# Patient Record
Sex: MALE | Race: WHITE | NOT HISPANIC OR LATINO
[De-identification: names, ages, dates, MRNs, and addresses within clinical notes are randomized per-mention and may not be internally consistent; named-entity substitution may affect disease eponyms.]

---

## 2023-01-01 ENCOUNTER — NON-APPOINTMENT (OUTPATIENT)
Age: 88
End: 2023-01-01

## 2023-01-01 ENCOUNTER — APPOINTMENT (OUTPATIENT)
Dept: HOME HEALTH SERVICES | Facility: HOME HEALTH | Age: 88
End: 2023-01-01
Payer: MEDICARE

## 2023-01-01 ENCOUNTER — RESULT REVIEW (OUTPATIENT)
Age: 88
End: 2023-01-01

## 2023-01-01 ENCOUNTER — APPOINTMENT (OUTPATIENT)
Dept: BREAST CENTER | Facility: CLINIC | Age: 88
End: 2023-01-01
Payer: MEDICARE

## 2023-01-01 ENCOUNTER — APPOINTMENT (OUTPATIENT)
Dept: HOME HEALTH SERVICES | Facility: HOME HEALTH | Age: 88
End: 2023-01-01

## 2023-01-01 ENCOUNTER — LABORATORY RESULT (OUTPATIENT)
Age: 88
End: 2023-01-01

## 2023-01-01 ENCOUNTER — TRANSCRIPTION ENCOUNTER (OUTPATIENT)
Age: 88
End: 2023-01-01

## 2023-01-01 ENCOUNTER — APPOINTMENT (OUTPATIENT)
Dept: RADIATION ONCOLOGY | Facility: CLINIC | Age: 88
End: 2023-01-01
Payer: MEDICARE

## 2023-01-01 ENCOUNTER — APPOINTMENT (OUTPATIENT)
Dept: THORACIC SURGERY | Facility: CLINIC | Age: 88
End: 2023-01-01

## 2023-01-01 ENCOUNTER — FORM ENCOUNTER (OUTPATIENT)
Age: 88
End: 2023-01-01

## 2023-01-01 ENCOUNTER — APPOINTMENT (OUTPATIENT)
Dept: RADIATION ONCOLOGY | Facility: CLINIC | Age: 88
End: 2023-01-01

## 2023-01-01 ENCOUNTER — APPOINTMENT (OUTPATIENT)
Dept: BREAST CENTER | Facility: HOSPITAL | Age: 88
End: 2023-01-01

## 2023-01-01 VITALS — SYSTOLIC BLOOD PRESSURE: 130 MMHG | DIASTOLIC BLOOD PRESSURE: 80 MMHG | OXYGEN SATURATION: 97 % | HEART RATE: 70 BPM

## 2023-01-01 VITALS
HEART RATE: 82 BPM | BODY MASS INDEX: 30.07 KG/M2 | TEMPERATURE: 97.5 F | DIASTOLIC BLOOD PRESSURE: 70 MMHG | WEIGHT: 203 LBS | HEIGHT: 69 IN | OXYGEN SATURATION: 96 % | SYSTOLIC BLOOD PRESSURE: 115 MMHG

## 2023-01-01 VITALS
HEART RATE: 87 BPM | WEIGHT: 203 LBS | OXYGEN SATURATION: 96 % | BODY MASS INDEX: 30.07 KG/M2 | HEIGHT: 69 IN | RESPIRATION RATE: 18 BRPM | SYSTOLIC BLOOD PRESSURE: 137 MMHG | DIASTOLIC BLOOD PRESSURE: 85 MMHG

## 2023-01-01 VITALS
RESPIRATION RATE: 18 BRPM | OXYGEN SATURATION: 97 % | HEART RATE: 95 BPM | SYSTOLIC BLOOD PRESSURE: 132 MMHG | DIASTOLIC BLOOD PRESSURE: 86 MMHG

## 2023-01-01 VITALS
SYSTOLIC BLOOD PRESSURE: 126 MMHG | OXYGEN SATURATION: 95 % | HEART RATE: 87 BPM | BODY MASS INDEX: 29.98 KG/M2 | WEIGHT: 203 LBS | DIASTOLIC BLOOD PRESSURE: 81 MMHG

## 2023-01-01 VITALS
BODY MASS INDEX: 30.07 KG/M2 | HEIGHT: 69 IN | OXYGEN SATURATION: 99 % | DIASTOLIC BLOOD PRESSURE: 78 MMHG | SYSTOLIC BLOOD PRESSURE: 130 MMHG | RESPIRATION RATE: 16 BRPM | HEART RATE: 85 BPM | WEIGHT: 203 LBS

## 2023-01-01 VITALS — HEART RATE: 80 BPM | OXYGEN SATURATION: 98 % | SYSTOLIC BLOOD PRESSURE: 130 MMHG | DIASTOLIC BLOOD PRESSURE: 80 MMHG

## 2023-01-01 VITALS
RESPIRATION RATE: 16 BRPM | SYSTOLIC BLOOD PRESSURE: 125 MMHG | OXYGEN SATURATION: 98 % | BODY MASS INDEX: 29.98 KG/M2 | WEIGHT: 203 LBS | DIASTOLIC BLOOD PRESSURE: 74 MMHG | HEART RATE: 96 BPM

## 2023-01-01 VITALS
SYSTOLIC BLOOD PRESSURE: 118 MMHG | HEART RATE: 77 BPM | TEMPERATURE: 98.1 F | DIASTOLIC BLOOD PRESSURE: 62 MMHG | OXYGEN SATURATION: 97 % | RESPIRATION RATE: 18 BRPM

## 2023-01-01 VITALS
RESPIRATION RATE: 18 BRPM | HEART RATE: 78 BPM | DIASTOLIC BLOOD PRESSURE: 68 MMHG | OXYGEN SATURATION: 97 % | TEMPERATURE: 98.4 F | SYSTOLIC BLOOD PRESSURE: 136 MMHG

## 2023-01-01 VITALS — OXYGEN SATURATION: 98 % | HEART RATE: 85 BPM | DIASTOLIC BLOOD PRESSURE: 80 MMHG | SYSTOLIC BLOOD PRESSURE: 130 MMHG

## 2023-01-01 VITALS
HEART RATE: 94 BPM | WEIGHT: 203 LBS | DIASTOLIC BLOOD PRESSURE: 74 MMHG | OXYGEN SATURATION: 97 % | RESPIRATION RATE: 16 BRPM | BODY MASS INDEX: 29.98 KG/M2 | SYSTOLIC BLOOD PRESSURE: 115 MMHG

## 2023-01-01 VITALS
OXYGEN SATURATION: 96 % | RESPIRATION RATE: 16 BRPM | SYSTOLIC BLOOD PRESSURE: 132 MMHG | HEART RATE: 80 BPM | DIASTOLIC BLOOD PRESSURE: 84 MMHG

## 2023-01-01 VITALS
TEMPERATURE: 97.8 F | SYSTOLIC BLOOD PRESSURE: 120 MMHG | RESPIRATION RATE: 18 BRPM | OXYGEN SATURATION: 96 % | HEART RATE: 82 BPM | DIASTOLIC BLOOD PRESSURE: 70 MMHG

## 2023-01-01 DIAGNOSIS — B37.0 CANDIDAL STOMATITIS: ICD-10-CM

## 2023-01-01 DIAGNOSIS — Z86.14 PERSONAL HISTORY OF METHICILLIN RESISTANT STAPHYLOCOCCUS AUREUS INFECTION: ICD-10-CM

## 2023-01-01 DIAGNOSIS — M1A.9XX0 CHRONIC GOUT, UNSPECIFIED, W/OUT TOPHUS (TOPHI): ICD-10-CM

## 2023-01-01 DIAGNOSIS — E03.9 HYPOTHYROIDISM, UNSPECIFIED: ICD-10-CM

## 2023-01-01 DIAGNOSIS — B36.9 SUPERFICIAL MYCOSIS, UNSPECIFIED: ICD-10-CM

## 2023-01-01 DIAGNOSIS — R06.09 OTHER FORMS OF DYSPNEA: ICD-10-CM

## 2023-01-01 DIAGNOSIS — S01.90XA UNSPECIFIED OPEN WOUND OF UNSPECIFIED PART OF HEAD, INITIAL ENCOUNTER: ICD-10-CM

## 2023-01-01 DIAGNOSIS — I72.4 ANEURYSM OF ARTERY OF LOWER EXTREMITY: ICD-10-CM

## 2023-01-01 DIAGNOSIS — R23.8 OTHER SKIN CHANGES: ICD-10-CM

## 2023-01-01 DIAGNOSIS — H10.9 UNSPECIFIED CONJUNCTIVITIS: ICD-10-CM

## 2023-01-01 DIAGNOSIS — S01.80XA UNSPECIFIED OPEN WOUND OF OTHER PART OF HEAD, INITIAL ENCOUNTER: ICD-10-CM

## 2023-01-01 PROCEDURE — 99024 POSTOP FOLLOW-UP VISIT: CPT

## 2023-01-01 PROCEDURE — 99203 OFFICE O/P NEW LOW 30 MIN: CPT

## 2023-01-01 PROCEDURE — 99344 HOME/RES VST NEW MOD MDM 60: CPT

## 2023-01-01 PROCEDURE — 99215 OFFICE O/P EST HI 40 MIN: CPT

## 2023-01-01 PROCEDURE — 99349 HOME/RES VST EST MOD MDM 40: CPT

## 2023-01-01 PROCEDURE — 99348 HOME/RES VST EST LOW MDM 30: CPT

## 2023-01-01 PROCEDURE — 99496 TRANSJ CARE MGMT HIGH F2F 7D: CPT

## 2023-01-01 RX ORDER — COLLAGENASE SANTYL 250 [ARB'U]/G
250 OINTMENT TOPICAL DAILY
Qty: 1 | Refills: 2 | Status: ACTIVE | COMMUNITY
Start: 2023-01-01 | End: 1900-01-01

## 2023-01-01 RX ORDER — GENTAMICIN SULFATE 3 MG/ML
0.3 SOLUTION OPHTHALMIC 4 TIMES DAILY
Qty: 1 | Refills: 3 | Status: ACTIVE | COMMUNITY
Start: 2023-01-01 | End: 1900-01-01

## 2023-01-01 RX ORDER — NYSTATIN 100000 1/G
100000 POWDER TOPICAL 3 TIMES DAILY
Qty: 1 | Refills: 2 | Status: ACTIVE | COMMUNITY
Start: 2023-01-01

## 2023-01-01 RX ORDER — BUSPIRONE HYDROCHLORIDE 5 MG/1
5 TABLET ORAL DAILY
Qty: 90 | Refills: 0 | Status: ACTIVE | COMMUNITY
Start: 2023-01-01

## 2023-01-01 RX ORDER — AMLODIPINE BESYLATE 5 MG/1
5 TABLET ORAL DAILY
Qty: 90 | Refills: 2 | Status: ACTIVE | COMMUNITY
Start: 2023-01-01 | End: 1900-01-01

## 2023-01-01 RX ORDER — SALIVA STIMULANT COMB. NO.3
SPRAY, NON-AEROSOL (ML) MUCOUS MEMBRANE
Qty: 1 | Refills: 0 | Status: ACTIVE | COMMUNITY
Start: 2023-01-01 | End: 1900-01-01

## 2023-01-01 RX ORDER — ALBUTEROL SULFATE 2.5 MG/3ML
(2.5 MG/3ML) SOLUTION RESPIRATORY (INHALATION) EVERY 6 HOURS
Qty: 360 | Refills: 2 | Status: ACTIVE | COMMUNITY
Start: 2023-01-01 | End: 1900-01-01

## 2023-01-01 RX ORDER — FUROSEMIDE 40 MG/1
40 TABLET ORAL
Qty: 30 | Refills: 0 | Status: DISCONTINUED | COMMUNITY
Start: 2023-01-01 | End: 2023-01-01

## 2023-01-01 RX ORDER — ASPIRIN ENTERIC COATED TABLETS 81 MG 81 MG/1
81 TABLET, DELAYED RELEASE ORAL
Qty: 90 | Refills: 2 | Status: ACTIVE | COMMUNITY
Start: 2023-01-01

## 2023-01-01 RX ORDER — MUPIROCIN 20 MG/G
2 OINTMENT TOPICAL TWICE DAILY
Qty: 1 | Refills: 0 | Status: DISCONTINUED | COMMUNITY
Start: 2023-01-01 | End: 2023-01-01

## 2023-01-01 RX ORDER — MUPIROCIN 20 MG/G
2 OINTMENT TOPICAL
Qty: 1 | Refills: 5 | Status: ACTIVE | COMMUNITY
Start: 2023-01-01 | End: 1900-01-01

## 2023-01-01 RX ORDER — NYSTATIN 100000 [USP'U]/ML
100000 SUSPENSION ORAL
Qty: 1 | Refills: 0 | Status: ACTIVE | COMMUNITY
Start: 2023-01-01 | End: 1900-01-01

## 2023-01-01 RX ORDER — SILVER SULFADIAZINE 10 MG/G
1 CREAM TOPICAL DAILY
Qty: 1 | Refills: 2 | Status: ACTIVE | COMMUNITY
Start: 2023-01-01 | End: 1900-01-01

## 2023-01-01 RX ORDER — IPRATROPIUM BROMIDE AND ALBUTEROL SULFATE 2.5; .5 MG/3ML; MG/3ML
0.5-2.5 (3) SOLUTION RESPIRATORY (INHALATION)
Qty: 2 | Refills: 3 | Status: ACTIVE | COMMUNITY
Start: 2023-01-01 | End: 1900-01-01

## 2023-01-01 RX ORDER — BUDESONIDE 0.5 MG/2ML
0.5 INHALANT ORAL TWICE DAILY
Qty: 90 | Refills: 3 | Status: ACTIVE | COMMUNITY
Start: 2023-01-01 | End: 1900-01-01

## 2023-02-23 PROBLEM — I72.4 POPLITEAL ANEURYSM: Status: RESOLVED | Noted: 2023-01-01 | Resolved: 2023-01-01

## 2023-02-23 PROBLEM — M1A.9XX0 CHRONIC GOUT: Status: ACTIVE | Noted: 2023-01-01

## 2023-02-23 NOTE — PHYSICAL EXAM
[No Acute Distress] : no acute distress [Well Nourished] : well nourished [Normal Sclera/Conjunctiva] : normal sclera/conjunctiva [Normal Outer Ear/Nose] : the ears and nose were normal in appearance [No JVD] : no jugular venous distention [Supple] : the neck was supple [No Respiratory Distress] : no respiratory distress [Clear to Auscultation] : lungs were clear to auscultation bilaterally [Normal Rate] : heart rate was normal  [Normal Bowel Sounds] : normal bowel sounds [Non Tender] : non-tender [Soft] : abdomen soft [de-identified] : fungal rash to groin

## 2023-02-23 NOTE — REVIEW OF SYSTEMS
[Incontinence] : incontinence [Muscle Weakness] : muscle weakness [Negative] : Gastrointestinal [de-identified] : rash to groin

## 2023-02-23 NOTE — HISTORY OF PRESENT ILLNESS
[Patient] : patient [Formal Caregiver] : formal caregiver [FreeTextEntry1] : Wheelchair bound [FreeTextEntry2] : Patient being seen for initial House Calls visit, patient lives with wife, who has dementia, and has 24/7 HHA.  Patient states that he has had 2 stents in his legs. As per DTR patient diagnosed in AAA many years ago, recently it was noted to have increased in size but no intervention was done.  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  He was on coumadin until recently when he had 2 large bleeds and decided ASA 81mg was safer. He gets Q6 month dopplers of lower extremities.  \par Daughter lives near by and comes to fill pill boxes.

## 2023-03-30 PROBLEM — E03.9 HYPOTHYROIDISM, UNSPECIFIED TYPE: Status: ACTIVE | Noted: 2023-01-01

## 2023-03-30 NOTE — HISTORY OF PRESENT ILLNESS
[Patient] : patient [Formal Caregiver] : formal caregiver [FreeTextEntry1] : Wheelchair bound [FreeTextEntry2] : Patient being seen for follow up House Calls visit, patient lives with wife, who has dementia, and has 24/7 HHA.  Patient states that he has had 2 stents in his legs. As per DTR patient diagnosed in AAA many years ago, recently it was noted to have increased in size but no intervention was done.  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  He was on coumadin until recently when he had 2 large bleeds and decided ASA 81mg was safer. He gets Q6 month dopplers of lower extremities.  \par Daughter lives near by and comes to fill pill boxes. \par \par Patient concerned about size of AAA, no study available to compare. \par Patient c/o wax in his ears.

## 2023-03-30 NOTE — PHYSICAL EXAM
[No Acute Distress] : no acute distress [Well Nourished] : well nourished [Normal Sclera/Conjunctiva] : normal sclera/conjunctiva [Normal Outer Ear/Nose] : the ears and nose were normal in appearance [Normal TMs] : both tympanic membranes were normal [No JVD] : no jugular venous distention [Supple] : the neck was supple [No Respiratory Distress] : no respiratory distress [Clear to Auscultation] : lungs were clear to auscultation bilaterally [Normal Rate] : heart rate was normal  [Normal Bowel Sounds] : normal bowel sounds [Non Tender] : non-tender [Soft] : abdomen soft [de-identified] : Rt side of head with 1 ich wound clean moist wound bed.

## 2023-03-30 NOTE — REVIEW OF SYSTEMS
[Incontinence] : incontinence [Muscle Weakness] : muscle weakness [Negative] : Gastrointestinal [de-identified] : rash to groin

## 2023-04-21 NOTE — HISTORY OF PRESENT ILLNESS
[Patient] : patient [FreeTextEntry1] : Wheelchair bound [FreeTextEntry2] : Patient being seen for concerns about wound to Rt temple area.  Patient states that the wound is getting bigger.  He has been applying mupirocin cream daily and covering with DSD. Denies any odor, drainage, or pain.

## 2023-04-21 NOTE — PHYSICAL EXAM
[No Acute Distress] : no acute distress [No Respiratory Distress] : no respiratory distress [Clear to Auscultation] : lungs were clear to auscultation bilaterally [Normal Rate] : heart rate was normal  [de-identified] : Rt temporal area with 1inch x 0.75 inch opening, clean moist pink wound bed.  No drainage or odor.

## 2023-07-12 PROBLEM — S01.90XA: Status: ACTIVE | Noted: 2023-01-01

## 2023-07-12 NOTE — PHYSICAL EXAM
[No Supraclavicular Adenopathy] : no supraclavicular adenopathy [No Cervical Adenopathy] : no cervical adenopathy [de-identified] : In the right temple/preauricular area is a 5 x 6 cm ulcerative lesion with firm granulation tissue at its base that is bleeding in multiple areas.  I applied silver nitrate to these areas as well as pressure which stopped the bleeding.

## 2023-07-12 NOTE — HISTORY OF PRESENT ILLNESS
[FreeTextEntry1] : 87-year-old gentleman with right temple facial skin tumor treated 5 years ago with Mohs surgery at [facility].  I do not have any of his reports.  He says approximately 1 month ago the right temple wound opened and is nonhealing with extreme sensitivity and occasional bleeding.  Patient is on aspirin but not on any other anticoagulation.  Patient was referred to me for biopsy and treatment by his dermatologist.  Patient is extremely sensitive and has a is already feeling faint while being in the office.

## 2023-07-31 PROBLEM — S01.80XA: Status: ACTIVE | Noted: 2023-01-01

## 2023-07-31 NOTE — PHYSICAL EXAM
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  The wound is clean and granulating.

## 2023-07-31 NOTE — ASSESSMENT
[FreeTextEntry1] : 87-year-old gentleman with right temple facial skin tumor treated 5 years ago with Mohs surgery at [facility].  I do not have any of his reports.  He says the right temple wound opened around June 2023 and is nonhealing with extreme sensitivity and occasional bleeding.  The patient is on aspirin but not on any other anticoagulation.  He was referred to me for biopsy and treatment by his dermatologist and the area is extremely sensitive and he was feeling faint while being in the office.  On exam in the office there was a 5 x 6 cm ulcerative lesion with granulation tissue at the base in the right temporal/preauricular region which was exquisitely tender and had some bleeding and Dr. Madison initially used some silver nitrate.  The patient could not tolerate a biopsy under local anesthesia in the office and was taken to the operating room on July 25, 2023 and underwent right temporal skin biopsies which showed multiple biopsies with poorly differentiated invasive squamous cell carcinoma.  On exam, this right temporal facial squamous cell carcinomas ulcerating but not actively bleeding at this time.  The wound is clean and the dressing was replaced with Silvadene dressing change. I went over the pathology with the patient and gave him a copy the report for his records.  The wound remains ulcerating and I would like him to continue local dressing changes with Silvadene. I would like him to follow-up with Dr. Madison in 2 weeks week for another wound evaluation.  He has an appointment to see Dr. Howe for a radiation oncology evaluation to determine a treatment plan for this patient.

## 2023-07-31 NOTE — HISTORY OF PRESENT ILLNESS
[FreeTextEntry1] : 87-year-old gentleman with right temple facial skin tumor treated 5 years ago with Mohs surgery at [facility].  I do not have any of his reports.  He says the right temple wound opened around June 2023 and is nonhealing with extreme sensitivity and occasional bleeding.  The patient is on aspirin but not on any other anticoagulation.  He was referred to me for biopsy and treatment by his dermatologist and the area is extremely sensitive and he was feeling faint while being in the office.  On exam in the office there was a 5 x 6 cm ulcerative lesion with granulation tissue at the base in the right temporal/preauricular region which was exquisitely tender and had some bleeding and Dr. Madison initially used some silver nitrate.  The patient could not tolerate a biopsy under local anesthesia in the office and was taken to the operating room on July 25, 2023 and underwent right temporal skin biopsies which showed multiple biopsies with poorly differentiated invasive squamous cell carcinoma.  He comes in now for a postoperative follow-up.

## 2023-08-14 NOTE — HISTORY OF PRESENT ILLNESS
[FreeTextEntry1] : MORRIS MARTINEZ is an 87 year old man with SCC of the right face referred for consideration of radiation therapy.  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 He states that he may have had one treated with radiation he cannot remember. He also states he had a lymph node removed from his left neck, he is not sure for what reason.  The patient has a history of a right temporal facial skin tumor treated 5 years ago with Mohs surgery at [facility] which has now recurred in the right temporal preauricular region.  He was taken to the operating room for skin biopsies under IV sedation by Dr. Madison on July 25, 2023.   The biopsies all showed poorly differentiated invasive squamous cell carcinoma.  8/8/2023 Today he is here to discuss radiation therapy options for his skin cancer diagnosis. He report's that his aide is changing his dressing daily and that it bleeds and has some purulent discharge.  He is using Silvadene cream to the area.  It is very tender. He also notes right facial droop since the lesion appeared.

## 2023-08-14 NOTE — REVIEW OF SYSTEMS
[Difficulty Walking] : difficulty walking [Negative] : Allergic/Immunologic [FreeTextEntry4] : facial lesion and right facial droop as per HPI [de-identified] : many scattered age related sun lesions

## 2023-08-14 NOTE — PHYSICAL EXAM
[General Appearance - Well Developed] : well developed [Sclera] : the sclera and conjunctiva were normal [Extraocular Movements] : extraocular movements were intact [] : no respiratory distress [Cervical Lymph Nodes Enlarged Posterior Bilaterally] : posterior cervical [Cervical Lymph Nodes Enlarged Anterior Bilaterally] : anterior cervical [Supraclavicular Lymph Nodes Enlarged Bilaterally] : supraclavicular [Oriented To Time, Place, And Person] : oriented to person, place, and time [de-identified] : Large ulcerated 6 x 6 cm lesion in the right james-auricular area; the lesion is very tender to palpation; areas of bleeding noted; s/p recent biopsy with stitch in place; very poor dentition - with few remaining teeth [de-identified] : No palpable neck nodes [de-identified] : Diffuse scattered age related skin lesions [de-identified] : Weakness of the right facial muscles with right facial droop noted and mild right ptosis; patient is unable to keep right eye closed against reisstance (impaired right facial nerve)

## 2023-08-14 NOTE — ADDENDUM
[FreeTextEntry1] : Since the patient was seen in consult, the following records were received regarding his history of malignancies: Per report (no record of the treatment), the patient had a skin cancer of the left temple treated by a single fraction of radiation in the 1970s. 1. 07/19/2001: shave biopsy of left temple cicatrix w/o residual carcinoma; skin left forehead and skin right cheek both positive for SCC in-situ 2. 08/06/2001: left temple shave excision positive for SCC in-situ, at least, present at deep margin 3. 05/21/2002: left temple re-excision positive for microscopic focus of SCC in-situ 4. 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 12/30/2012: skin left temple biopsy - ulcerated atypical intradermal spindle cell neoplasm  6. 02/27/2013: left extensor forearm - invasive SCC well to moderately differentiated 7. 03/21/2013: left extensor forearm - invasive SCC well to moderately differentiated 8. 03/18/2013: skin excision left side of face - cicatrix, no residual atypical spindle cell neoplasm 9. 04/30/2013: left distal arm shave biopsy SCC well to moderately differentiated 10. 05/21/2013: left upper arm excision, SCC well to moderately differentiated, +PNI, invading full thickness of dermis, Pt2N0 Patient received adjuvant radiation to the left elbow using electrons to 48Gy in 20 Fx from 06/26/2013-07/24/2013 11. 07/22/2014: left axillary mass FNA - squamous cell carcinoma 12. 09/05/2014: left axillary mass excision - squamous cell carcinoma in subcutis. all LNs negative. The patient received a course of adjuvant radiation to the left axilla to 54Gy in 27 Fx from 02/17/2015-04/01/2015 13. 03/18/2015: right nasal sidewalll SCC at least in-situ 14. 08/24/2016: ride side of neck -  SCC in-situ 15. 06/21/2018: skin right forehead - SCC in situ; skin left cheek - SCC invasive well to moderately differentiated; skin right side of neck - SCC in situ; skin left mandibular cheek - SCC at least in situ; skin dorsum of left hand - SCC superficially invasive  16. 04/04/2019: left CW FNA was non-Diagnostic  In summary, what is relevant to the current case is that: 1. The patient had a very distant h/o RT to the left temple in the 1970s for a skin cancer. This cancer then recurred in 2001 as a SCC, in situ at least and was removed surgically. It then recurred in 2012 as a spindle cell neoplasm. This was apparently removed entirely in a biopsy bc repeat biopsy in 2013 revealed no residual spindle cells. There does not appear to have been any radiation treatment given to this area. He states all his RT has been given at [facility] and they have no record of RT to the left temple. 2. The patient has had 3 prior courses of RT - (1) to the larynx and bilateral neck in 2010; (2) to the left arm in 2023 for pT2 SCC; (3) to the left axilla in 2015 for a large SCC involving a left axillary LN  In light of the above, I believe it would be safe to deliver a course of definitive radiation to his NEW RIGHT temple squamous cell cancer. He has not received RT to the right temple nor is there a documnted hx of cancer of the right temple until presently

## 2023-08-16 NOTE — REASON FOR VISIT
[Post Op: _________] : a [unfilled] post op visit [FreeTextEntry1] : Status post biopsies of right temporal lesion

## 2023-08-16 NOTE — HISTORY OF PRESENT ILLNESS
[FreeTextEntry1] : 87-year-old gentleman with right temple facial skin tumor treated 5 years ago with Mohs surgery at [facility].  I do not have any of his reports.  He says approximately 1 month ago the right temple wound opened and is nonhealing with extreme sensitivity and occasional bleeding.  Patient is on aspirin but not on any other anticoagulation.  Patient was referred to me for biopsy and treatment by his dermatologist.  Patient is extremely sensitive and has a is already feeling faint while being in the office.  Patient had operative biopsies of this area which revealed poorly differentiated invasive squamous cell carcinoma.  Patient has seen radiation oncology is trying to figure out his prior radiation history and once they do that he will set up up for radiation of this area for local control.  Otherwise patient is doing well, pain under control.

## 2023-12-18 PROBLEM — R23.8 SLOUGHING OF WOUND: Status: ACTIVE | Noted: 2023-01-01

## 2023-12-19 PROBLEM — B36.9 FUNGAL DERMATITIS: Status: RESOLVED | Noted: 2023-01-01 | Resolved: 2023-01-01

## 2023-12-19 PROBLEM — R06.09 DOE (DYSPNEA ON EXERTION): Status: RESOLVED | Noted: 2023-01-01 | Resolved: 2023-01-01

## 2023-12-19 PROBLEM — H10.9 CONJUNCTIVITIS, RIGHT EYE: Status: ACTIVE | Noted: 2023-01-01 | Resolved: 2024-01-01

## 2023-12-21 PROBLEM — B37.0 THRUSH: Status: ACTIVE | Noted: 2023-01-01

## 2023-12-28 NOTE — PHYSICAL EXAM
[No Acute Distress] : no acute distress [Normal Voice/Communication] : normal voice communication [Normal Outer Ear/Nose] : the ears and nose were normal in appearance [No JVD] : no jugular venous distention [No Respiratory Distress] : no respiratory distress [Clear to Auscultation] : lungs were clear to auscultation bilaterally [Normal Rate] : heart rate was normal  [Regular Rhythm] : with a regular rhythm [Normal S1, S2] : normal S1 and S2 [Normal Bowel Sounds] : normal bowel sounds [Non Tender] : non-tender [Oriented x3] : oriented to person, place, and time [Normal Affect] : the affect was normal [Normal Mood] : the mood was normal [de-identified] : Rt lower eye lid drooping, covered with eye patch for protection [de-identified] : multiple bulges, abdomen soft  [de-identified] : bed bound [de-identified] : dressing in place to Rt temple and sacral ulcer

## 2023-12-28 NOTE — REASON FOR VISIT
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 CT scan showing improvement in the lung abscess, but new nodularities and increased bronchial secretions, with concern for aspiration. Patient evaluated by ID.  Patient was started on Rocephin for gram-negative coverage.  Patient continued to remain on 5 L oxygen despite antibiotic treatment.  He was noted to be unable to manage secretions by the nursing repeat SLP evaluation was done which showed patient is unable to swallow anything at this point safely.  Patient was made n.p.o. and started on IV fluids, Patient continued to be high risk for aspiration. He had repeated swallow evaluations and an MBS, which showed aspiration of thin liquids, penetration of thickened liquids. The patient was getting tired after a few sips. SLP continues to advise NPO. D/W Patient and daughter regarding the results and further plan. Patient and daughter clear about not wanting feeding tube , but want patient wants comfort feeds. The patient's daughter reports she does not want to withdraw care, but does not want anything aggressive if patient aspirates again. Daughter was clearly explained the patient was choking when nursing was trying to feed him. Daughter wants to take responsibility of feeding the patient in hospital. Patient remains DNR/DNI.  Patient's family has been feeding him pureed and moderately thick liquids.  The patient was discharged home on remainder of abx with vancomycin, lita lift, and home O2.

## 2023-12-28 NOTE — HISTORY OF PRESENT ILLNESS
[Patient] : patient [FreeTextEntry1] : Bed bound [FreeTextEntry2] : Patient being seen for post discharge.  States he feels like his swallowing is getting better "I am not coughing"  DTR in a nurse practitioner and she is coming over to do IV antibiotics.

## 2024-01-01 ENCOUNTER — APPOINTMENT (OUTPATIENT)
Dept: GERIATRICS | Facility: CLINIC | Age: 89
End: 2024-01-01
Payer: MEDICARE

## 2024-01-01 ENCOUNTER — APPOINTMENT (OUTPATIENT)
Dept: HOME HEALTH SERVICES | Facility: HOME HEALTH | Age: 89
End: 2024-01-01
Payer: MEDICARE

## 2024-01-01 ENCOUNTER — NON-APPOINTMENT (OUTPATIENT)
Age: 89
End: 2024-01-01

## 2024-01-01 ENCOUNTER — APPOINTMENT (OUTPATIENT)
Dept: GERIATRICS | Facility: CLINIC | Age: 89
End: 2024-01-01

## 2024-01-01 VITALS — HEART RATE: 78 BPM | OXYGEN SATURATION: 96 % | DIASTOLIC BLOOD PRESSURE: 80 MMHG | SYSTOLIC BLOOD PRESSURE: 120 MMHG

## 2024-01-01 DIAGNOSIS — K40.90 UNILATERAL INGUINAL HERNIA, W/OUT OBSTRUCTION OR GANGRENE, NOT SPECIFIED AS RECURRENT: ICD-10-CM

## 2024-01-01 DIAGNOSIS — T17.908A UNSPECIFIED FOREIGN BODY IN RESPIRATORY TRACT, PART UNSPECIFIED CAUSING OTHER INJURY, INITIAL ENCOUNTER: ICD-10-CM

## 2024-01-01 DIAGNOSIS — I10 ESSENTIAL (PRIMARY) HYPERTENSION: ICD-10-CM

## 2024-01-01 DIAGNOSIS — J44.9 CHRONIC OBSTRUCTIVE PULMONARY DISEASE, UNSPECIFIED: ICD-10-CM

## 2024-01-01 DIAGNOSIS — I73.9 PERIPHERAL VASCULAR DISEASE, UNSPECIFIED: ICD-10-CM

## 2024-01-01 DIAGNOSIS — J85.2 ABSCESS OF LUNG W/OUT PNEUMONIA: ICD-10-CM

## 2024-01-01 DIAGNOSIS — L89.313 PRESSURE ULCER OF RIGHT BUTTOCK, STAGE 3: ICD-10-CM

## 2024-01-01 DIAGNOSIS — I71.40 ABDOMINAL AORTIC ANEURYSM, WITHOUT RUPTURE, UNSPECIFIED: ICD-10-CM

## 2024-01-01 DIAGNOSIS — Z51.5 ENCOUNTER FOR PALLIATIVE CARE: ICD-10-CM

## 2024-01-01 DIAGNOSIS — C44.320 SQUAMOUS CELL CARCINOMA OF SKIN OF UNSPECIFIED PARTS OF FACE: ICD-10-CM

## 2024-01-01 DIAGNOSIS — L98.9 DISORDER OF THE SKIN AND SUBCUTANEOUS TISSUE, UNSPECIFIED: ICD-10-CM

## 2024-01-01 DIAGNOSIS — Z71.89 OTHER SPECIFIED COUNSELING: ICD-10-CM

## 2024-01-01 DIAGNOSIS — Z74.01 BED CONFINEMENT STATUS: ICD-10-CM

## 2024-01-01 PROCEDURE — 99496 TRANSJ CARE MGMT HIGH F2F 7D: CPT

## 2024-01-01 PROCEDURE — 99205 OFFICE O/P NEW HI 60 MIN: CPT | Mod: 95

## 2024-01-01 RX ORDER — DOXYCYCLINE HYCLATE 100 MG/1
100 CAPSULE ORAL
Qty: 60 | Refills: 0 | Status: ACTIVE | COMMUNITY
Start: 2024-01-01

## 2024-01-05 PROBLEM — I10 HYPERTENSION, ESSENTIAL: Status: ACTIVE | Noted: 2023-01-01

## 2024-01-05 NOTE — HISTORY OF PRESENT ILLNESS
[FreeTextEntry1] : 1. The patient had a very distant h/o RT to the left temple in the 1970s for a skin cancer. This cancer then recurred in 2001 as a SCC, in situ at least and was removed surgically. It then recurred in 2012 as a spindle cell neoplasm. This was apparently removed entirely in a biopsy bc repeat biopsy in 2013 revealed no residual spindle cells. There does not appear to have been any radiation treatment given to this area. He states all his RT has been given at [facility] [place] and they have no record of RT to the left temple. 2. The patient has had 3 prior courses of RT - (1) to the larynx and bilateral neck in 2010; (2) to the left arm in 2023 for pT2 SCC; (3) to the left axilla in 2015 for a large SCC involving a left axillary LN  He is now s/p 33 Fxs to the right temple completed on 10/25/23  The patients recovery was hampered by a bout of pneumonia for which he was hospitalized here at [facility].  He was since discharged and sent to rehab.   12/8/23 Today he is here for PTE

## 2024-01-12 NOTE — REASON FOR VISIT
[Home] : at home, [unfilled] , at the time of the visit. [Medical Office: ([facility])___] : at the medical office located in  [Family Member] : family member [Patient] : the patient [Initial Evaluation] : an initial evaluation

## 2024-01-19 PROBLEM — L89.313 PRESSURE INJURY OF RIGHT BUTTOCK, STAGE 3: Status: ACTIVE | Noted: 2023-01-01

## 2024-01-19 PROBLEM — I71.40 AAA (ABDOMINAL AORTIC ANEURYSM): Status: ACTIVE | Noted: 2023-01-01

## 2024-01-19 PROBLEM — I73.9 PVD (PERIPHERAL VASCULAR DISEASE): Status: ACTIVE | Noted: 2023-01-01

## 2024-01-19 PROBLEM — J44.9 COPD, MODERATE: Status: ACTIVE | Noted: 2023-01-01

## 2024-01-19 PROBLEM — Z71.89 COUNSELING REGARDING ADVANCE DIRECTIVES AND GOALS OF CARE: Status: ACTIVE | Noted: 2024-01-01

## 2024-01-19 PROBLEM — Z51.5 ENCOUNTER FOR PALLIATIVE CARE: Status: ACTIVE | Noted: 2024-01-01

## 2024-01-19 PROBLEM — Z74.01 BEDBOUND: Status: ACTIVE | Noted: 2024-01-01

## 2024-01-19 PROBLEM — T17.908A ASPIRATION, CHRONIC PULMONARY: Status: ACTIVE | Noted: 2024-01-01

## 2024-01-19 PROBLEM — L98.9 SKIN LESION: Status: ACTIVE | Noted: 2023-01-01

## 2024-01-19 PROBLEM — C44.320 SQUAMOUS CELL CARCINOMA, FACE: Status: ACTIVE | Noted: 2023-01-01

## 2024-01-19 PROBLEM — J85.2 LUNG ABSCESS: Status: ACTIVE | Noted: 2024-01-01

## 2024-01-19 NOTE — PHYSICAL EXAM
[Sclera] : the sclera and conjunctiva were normal [Normal Oral Mucosa] : normal oral mucosa [Neck Appearance] : the appearance of the neck was normal [] : no respiratory distress [Edema] : there was no peripheral edema [FreeTextEntry1] : Answers basic questions appropriately

## 2024-01-19 NOTE — ASSESSMENT
[FreeTextEntry1] : 89 y/o M w/ SCC of the face s/p XRT, epiglottic CA s/p resection + XRT, AAA, b/l popliteal aneurysm s/p b/l fem-pop, C. diff c/b perf s/p partial colectomy/ileostomy s/p reversal, stage III sacral pressure ulcer, MRSA, lung abscess. PPS 20%.   Spoke at length to daughter regarding GOC for patient. Discussed differences between palliative and hospice care. 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 Plan is to see how he feels over the weekend and revisit on Monday.   We discussed the pros/cons of ongoing, intermittent IV hydration- I voiced concern for fluid overload, and agreed to discuss on a case by case basis.  Lastly, reviewed comfort feeds, which she would like to continue, as patient is asking for food. Discussed aspiration precautions at length; she voiced understanding  For now- will see how he does with his coug/  questionable PNA over the weekend and I will check back in on Monday

## 2024-01-19 NOTE — HISTORY OF PRESENT ILLNESS
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 MBS --> showed aspiration of thin liquids, penetration of thickened liquids. D/c'd on comfort feeds.  11/20-11/30: [facility] for hempotsysis, found to have cavitary lesion in lung, negative for TB, treated with IB ABX for lung abscess, + MRSA 11/4-11/10: [facility] for PING PNA, d/c rehab  Chest CTA 12/3--> 1.  No pulmonary embolism. 2.  Interval decrease in masslike left upper lobe opacity compared to 11/20/2023, with increased cavitation. 3.  Worsening bronchial secretions, with increased right-sided clustered nodular opacities due to aspiration or infection.   Neck CT 08/23 -->  Findings consistent with history of extensive right facial squamous cell cancer. There is skin thickening and skin ulceration. There is deep extension with invasion of the right masseter muscle. Tumor/involved masseter muscle extends deep to the coronoid process of the right mandible. There is lytic destruction of the central aspect of the right zygomatic arch consistent with tumor invasion (although I cannot exclude infection in view of skin ulceration). Tumor extends dorsally almost to the right external auditory canal. Tumor extends to the anterior aspect of the right parotid gland with possible invasion of the parotid gland. Focal skin thickening extends inferiorly to the level of the right mandible. Focal skin thickening extends superiorly to the level of the orbital roof. However, there is diffuse skin thickening extending to the superior aspect of the scan that likely related to edema rather than diffuse tumor extension. No pathologic adenopathy. No evidence of recurrent epiglottic tumor. 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 Severe stenosis of the left internal carotid artery distal to its origin.  Ghazala Kellogg (daughter, is an NP): [phone]  1/19 (Initial eval):Pt seen at home laying in bed. Leeann shea reports that since Monday, + worsening cough, CXR has been ordered by PCP.  Overall weaker since last week . Currently on 4L continuous- usually about 91-92%- has been on this since lung abscess. Coughing kept him up. Slurred speech, difficult to understand. Says he wants to eat which is a change for him.    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 Remains on Doxy. Daughter aware that he is chronically aspirating, but seems to be tolerating pureed diet with thickened liquids and working on improving swallowing. She reports not wanting hospice- does not want to lose [facility] services as patient is very close with his visiting RN.   Daughter states that he wants to be medically managed within context of DNR/DNI and would consider readmission. Also inquiring about possibility of IVF. Currently bedbound.